# Patient Record
Sex: FEMALE | Race: WHITE | NOT HISPANIC OR LATINO | Employment: PART TIME | URBAN - METROPOLITAN AREA
[De-identification: names, ages, dates, MRNs, and addresses within clinical notes are randomized per-mention and may not be internally consistent; named-entity substitution may affect disease eponyms.]

---

## 2018-06-21 ENCOUNTER — APPOINTMENT (OUTPATIENT)
Dept: RADIOLOGY | Facility: MEDICAL CENTER | Age: 68
End: 2018-06-21
Payer: MEDICARE

## 2018-06-21 ENCOUNTER — OFFICE VISIT (OUTPATIENT)
Dept: URGENT CARE | Facility: MEDICAL CENTER | Age: 68
End: 2018-06-21
Payer: MEDICARE

## 2018-06-21 VITALS
HEART RATE: 73 BPM | OXYGEN SATURATION: 98 % | WEIGHT: 198.6 LBS | BODY MASS INDEX: 36.55 KG/M2 | HEIGHT: 62 IN | TEMPERATURE: 97.4 F | RESPIRATION RATE: 18 BRPM | SYSTOLIC BLOOD PRESSURE: 179 MMHG | DIASTOLIC BLOOD PRESSURE: 81 MMHG

## 2018-06-21 DIAGNOSIS — M25.561 ACUTE PAIN OF RIGHT KNEE: ICD-10-CM

## 2018-06-21 DIAGNOSIS — R00.2 FLUTTERING SENSATION OF HEART: ICD-10-CM

## 2018-06-21 DIAGNOSIS — M25.561 ACUTE PAIN OF RIGHT KNEE: Primary | ICD-10-CM

## 2018-06-21 LAB
ATRIAL RATE: 68 BPM
P AXIS: 52 DEGREES
PR INTERVAL: 154 MS
QRS AXIS: 4 DEGREES
QRSD INTERVAL: 136 MS
QT INTERVAL: 458 MS
QTC INTERVAL: 487 MS
T WAVE AXIS: 9 DEGREES
VENTRICULAR RATE: 68 BPM

## 2018-06-21 PROCEDURE — 93010 ELECTROCARDIOGRAM REPORT: CPT | Performed by: INTERNAL MEDICINE

## 2018-06-21 PROCEDURE — 93005 ELECTROCARDIOGRAM TRACING: CPT | Performed by: NURSE PRACTITIONER

## 2018-06-21 PROCEDURE — G0463 HOSPITAL OUTPT CLINIC VISIT: HCPCS | Performed by: NURSE PRACTITIONER

## 2018-06-21 PROCEDURE — 99204 OFFICE O/P NEW MOD 45 MIN: CPT | Performed by: NURSE PRACTITIONER

## 2018-06-21 PROCEDURE — 73564 X-RAY EXAM KNEE 4 OR MORE: CPT

## 2018-06-21 RX ORDER — ASCORBIC ACID 500 MG
500 TABLET ORAL DAILY
COMMUNITY

## 2018-06-21 RX ORDER — CHLORAL HYDRATE 500 MG
1000 CAPSULE ORAL DAILY
COMMUNITY

## 2018-06-21 RX ORDER — ASPIRIN 81 MG/1
81 TABLET ORAL DAILY
COMMUNITY

## 2018-06-21 RX ORDER — KETOROLAC TROMETHAMINE 30 MG/ML
60 INJECTION, SOLUTION INTRAMUSCULAR; INTRAVENOUS ONCE
Status: COMPLETED | OUTPATIENT
Start: 2018-06-21 | End: 2018-06-21

## 2018-06-21 RX ORDER — MULTIVITAMIN
1 TABLET ORAL DAILY
COMMUNITY

## 2018-06-21 RX ORDER — MELATONIN
1000 DAILY
COMMUNITY

## 2018-06-21 RX ADMIN — KETOROLAC TROMETHAMINE 60 MG: 30 INJECTION, SOLUTION INTRAMUSCULAR; INTRAVENOUS at 09:26

## 2018-06-21 NOTE — PROGRESS NOTES
[facility]        NAME: Jennifer Andrade is a 79 y o  female  : 1950    MRN: [id number]  DATE: 2018  TIME: 9:51 AM    Assessment and Plan   Acute pain of right knee [M25 561]  1  Acute pain of right knee  ketorolac (TORADOL) injection 60 mg    XR knee 4+ vw right injury   2  Fluttering sensation of heart  POCT ECG         Patient Instructions   In office x ray of R knee interpreted as negative for acute fracture, suspect degenerative changes  In office EKG interpreted as NSR, R BBB  Gentle stretches, gentle massage  NSAIDs as needed  Apply ice locally  Elevate affected extremity when at rest    Advance activity as tolerated  Utilize ACE wrap and cane when awake  Wear supportive shoes  Establish care with PCP  Proceed to  ER if symptoms worsen  Chief Complaint     Chief Complaint   Patient presents with    Leg Pain     Patient relates started with right upper thigh pain since yesterday  Denies injury  States, yesterday she was short of breath and heart beating fast          History of Present Illness       Patient presents with R thigh pain radiating down behind R knee  Reports was like that yesterday but seems to have progressively getting worse  Reports has had to "pull R leg along"  Denies injury, trauma, fall  Recent increase in yard work  Intermittent numbness of R thigh  Applied ice and heat with no improvement  Took two aleve this am at around 0230 with no improvement  Stands on feet at work  Denies bleeding disorders, blood clots  Reports also having chest palpitations yesterday with SOB, relates it to time of increased pain  Reports continued through shift and then resolved at home after applying a pillow to chest  No otc meds taken  Denies chest pain  No family doctor  Review of Systems   Review of Systems   Constitutional: Negative for chills and fever  Respiratory: Negative for chest tightness, shortness of breath and wheezing      Cardiovascular: Positive for palpitations  Negative for chest pain  Musculoskeletal: Positive for arthralgias, gait problem, joint swelling and myalgias  Skin: Negative for rash  Current Medications       Current Outpatient Prescriptions:     ascorbic acid (VITAMIN C) 500 mg tablet, Take 500 mg by mouth daily, Disp: , Rfl:     aspirin (ECOTRIN LOW STRENGTH) 81 mg EC tablet, Take 81 mg by mouth daily, Disp: , Rfl:     Calcium 150 MG TABS, Take by mouth daily, Disp: , Rfl:     cholecalciferol (VITAMIN D3) 1,000 units tablet, Take 1,000 Units by mouth daily, Disp: , Rfl:     Cyanocobalamin (VITAMIN B 12 PO), Take 500 mg by mouth daily, Disp: , Rfl:     Multiple Vitamin (MULTIVITAMIN) tablet, Take 1 tablet by mouth daily, Disp: , Rfl:     Omega-3 Fatty Acids (FISH OIL) 1,000 mg, Take 1,000 mg by mouth daily, Disp: , Rfl:   No current facility-administered medications for this visit  Current Allergies     Allergies as of 06/21/2018    (No Known Allergies)            The following portions of the patient's history were reviewed and updated as appropriate: allergies, current medications, past family history, past medical history, past social history, past surgical history and problem list      Past Medical History:   Diagnosis Date    Osteopenia        No past surgical history on file  No family history on file  Medications have been verified  Objective   BP (!) 179/81   Pulse 73   Temp (!) 97 4 °F (36 3 °C) (Tympanic)   Resp 18   Ht 5' 2" (1 575 m)   Wt 90 1 kg (198 lb 9 6 oz)   SpO2 98%   BMI 36 32 kg/m²        Physical Exam     Physical Exam   Constitutional: She is oriented to person, place, and time  She appears well-developed and well-nourished  No distress  HENT:   Head: Normocephalic and atraumatic  Eyes: Pupils are equal, round, and reactive to light  Cardiovascular: Normal rate, regular rhythm, normal heart sounds and intact distal pulses      Pulmonary/Chest: Effort normal and breath sounds normal  No respiratory distress  She has no wheezes  Musculoskeletal:        Right knee: She exhibits decreased range of motion, swelling and bony tenderness  She exhibits no effusion, no ecchymosis, no laceration and no erythema  Tenderness found  Medial joint line and lateral joint line tenderness noted  Right ankle: Normal         Right upper leg: She exhibits tenderness  She exhibits no bony tenderness  Legs:  Neurological: She is alert and oriented to person, place, and time  Skin: Skin is warm and dry  No rash noted  She is not diaphoretic  Psychiatric: She has a normal mood and affect  Nursing note and vitals reviewed

## 2018-06-21 NOTE — LETTER
June 21, 2018     Patient: Carol Hamm   YOB: 1950   Date of Visit: 6/21/2018       To Whom it May Concern:    Carol Hamm was seen in my clinic on 6/21/2018  She may return to work on Friday, June 22, 2018  If you have any questions or concerns, please don't hesitate to call           Sincerely,          RENUKA Medina        CC: No Recipients

## 2018-06-21 NOTE — PATIENT INSTRUCTIONS
Gentle stretches, gentle massage  NSAIDs as needed  Apply ice locally  Elevate affected extremity when at rest    Advance activity as tolerated  Utilize ACE wrap when awake  Wear supportive shoes  Follow up with PCP as discussed or go to nearest ED if exacerbation of symptoms  Arthritis   AMBULATORY CARE:   Arthritis  is a disease that causes inflammation in one or more joints  There are many types of arthritis, such as osteoarthritis, rheumatoid arthritis, and septic arthritis  Some types cause inflammation in the joints  Other types wear away the cartilage between joints  This makes the bones of the joint rub together when you move the joint  Your symptoms may be constant, or symptoms may come and go  Arthritis often gets worse over time and can cause permanent joint damage  Common signs and symptoms of arthritis:   · Pain, swelling, or stiffness in the joint    · Limited range of motion in the joint    · Warmth or redness over the joint    · Tenderness when you touch the joint    · Stiff joints in the morning that loosen with movement    · A creaking or grinding sound when you move the joint    · Fever  Seek care immediately if:   · You have a fever and severe joint pain or swelling  · You cannot move the affected joint  · You have severe joint pain you cannot tolerate  Contact your healthcare provider if:   · Your pain or swelling does not get better with treatment  · You have questions or concerns about your condition or care  Treatment  will depend on the type of arthritis you have and if it is severe  You may need any of the following:  · Acetaminophen  decreases pain and fever  It is available without a doctor's order  Ask how much to take and how often to take it  Follow directions  Acetaminophen can cause liver damage if not taken correctly  · NSAIDs , such as ibuprofen, help decrease swelling, pain, and fever  This medicine is available with or without a doctor's order  NSAIDs can cause stomach bleeding or kidney problems in certain people  If you take blood thinner medicine, always ask your healthcare provider if NSAIDs are safe for you  Always read the medicine label and follow directions  · Steroid medicine  helps reduce swelling and pain  · Surgery  may be needed to repair or replace a damaged joint  Manage arthritis:   · Rest your painful joint so it can heal   Your healthcare provider may recommend crutches or a walker if the affected joint is in a leg  · Apply ice or heat to the joint  Both can help decrease swelling and pain  Ice may also help prevent tissue damage  Use an ice pack, or put crushed ice in a plastic bag  Cover it with a towel and place it on your joint for 15 to 20 minutes every hour or as directed  You can apply heat for 20 minutes every 2 hours  Heat treatment includes hot packs or heat lamps  · Elevate your joint  Elevation helps reduce swelling and pain  Raise your joint above the level of your heart as often as you can  Prop your painful joint on pillows to keep it above your heart comfortably  · Go to physical or occupational therapy as directed  A physical therapist can teach you exercises to improve flexibility and range of motion  You may also be shown non-weight-bearing exercises that are safe for your joints, such as swimming  Exercise can help keep your joints flexible and reduce pain  An occupational therapist can help you learn to do your daily activities when your joints are stiff or sore  · Maintain a healthy weight  Extra weight puts increased pressure on your joints  Ask your healthcare provider what you should weigh  If you need to lose weight, he can help you create a weight loss program  Weight loss can help reduce pain and increase your ability to do your activities  The amount of exercise you do may vary each day, depending on your symptoms  · Wear flat or low-heeled shoes    This will help decrease pain and reduce pressure on your ankle, knee, and hip joints  · Use support devices as directed  You may be given splints to wear on your hands to help your joints rest and to decrease inflammation  While you sleep, use a pillow that is firm enough to support your neck and head  Other equipment  that may help you move and prevent falls:  · Orthotic shoes or insoles  help support your feet when you walk  · Crutches, a cane, or a walker  may help decrease your risk for falling  They also decrease stress on affected joints  · Devices to prevent falls  include raised toilet seats and bathtub bars to help you get up from sitting  Handrails can be placed in areas where you need balance and support  Follow up with your healthcare provider or rheumatologist as directed:  Write down your questions so you remember to ask them during your visits  © 2017 [employer] Information is for End User's use only and may not be sold, redistributed or otherwise used for commercial purposes  All illustrations and images included in CareNotes® are the copyrighted property of [facility] , [employer]  or [employer]oe  The above information is an  only  It is not intended as medical advice for individual conditions or treatments  Talk to your doctor, nurse or pharmacist before following any medical regimen to see if it is safe and effective for you

## 2018-08-14 ENCOUNTER — EVALUATION (OUTPATIENT)
Dept: PHYSICAL THERAPY | Facility: MEDICAL CENTER | Age: 68
End: 2018-08-14
Payer: MEDICARE

## 2018-08-14 DIAGNOSIS — M00.9 PYOGENIC ARTHRITIS OF RIGHT KNEE JOINT, DUE TO UNSPECIFIED ORGANISM (HCC): Primary | ICD-10-CM

## 2018-08-14 DIAGNOSIS — M25.561 RIGHT KNEE PAIN, UNSPECIFIED CHRONICITY: ICD-10-CM

## 2018-08-14 PROCEDURE — 97535 SELF CARE MNGMENT TRAINING: CPT | Performed by: PHYSICAL THERAPIST

## 2018-08-14 PROCEDURE — 97161 PT EVAL LOW COMPLEX 20 MIN: CPT | Performed by: PHYSICAL THERAPIST

## 2018-08-14 PROCEDURE — G8979 MOBILITY GOAL STATUS: HCPCS | Performed by: PHYSICAL THERAPIST

## 2018-08-14 PROCEDURE — G8978 MOBILITY CURRENT STATUS: HCPCS | Performed by: PHYSICAL THERAPIST

## 2018-08-14 NOTE — PROGRESS NOTES
PT Evaluation     Today's date: 2018  Patient name: Sheryl Leal  : 1950  MRN: [id number]  Referring provider: Akira Calero PA-C  Dx:   Encounter Diagnosis     ICD-10-CM    1  Pyogenic arthritis of right knee joint, due to unspecified organism ([facility] [address] ) M00 9    2  Right knee pain, unspecified chronicity M25 561                   Assessment  Impairments: abnormal gait, abnormal or restricted ROM, activity intolerance, impaired balance, impaired physical strength and lacks appropriate home exercise program    Assessment details: Ms Louise Pham is a very pleasant 79 y o  Female who presents today s/p sept arthritis of the right knee  No further referral appears necessary at this time based upon exam findings  No signs/symptoms of infection or DVT noted  Reviewed signs/symptoms of infection and DVT with patient in which she verbalizes understanding  Reviewed and discussed with patient keeping wounds dry and clean  Patient presents with decreased ROM and strength of the right knee leading to abmulatory dysfucntion limiting her ability to walk independently, return to work, and return to driving  Patient instructed in initial HEP  Impairment list:  1) right LE swelling- will address with muscle pump exercises and modalities  2) decreased right knee ROM- will address with ROM and mobility exercises  3) poor quadriceps neuromuscular control- will address with NMRE  4) decreased right LE strength- will address with progressive strengthening  5) ambulatory dysfunction- will address with gait training  Understanding of Dx/Px/POC: good   Prognosis: good  Prognosis details: Prognosis is good given compliance HEP and PT 2x/week for 10 weeks  Positive prognostic factors: motivated to improve, motivated to return to work, caregiver support  Negative prognostic factors: BMI > 30, osteoporosis, length of hospital stay    Goals  1  Patient will be independent in individualized HEP    2  Patient will ambulate without an AD   3  Patient will be able to negotiate stairs with step over pattern  4  Patient will be able to return to work without limitation due to WB intolerance  5  Patient will return to all activities at [facility]  Plan  Patient would benefit from: skilled physical therapy  Planned modality interventions: cryotherapy  Planned therapy interventions: manual therapy, patient education, balance, gait training, home exercise program, therapeutic activities, therapeutic exercise and functional ROM exercises  Frequency: 2x week  Duration in weeks: 10  Treatment plan discussed with: patient  Plan details: Please contact me with any questions  Thank you for the referral          Subjective Evaluation    History of Present Illness  Mechanism of injury: Ms Jadine Riedel is a 79 y o  Female who presents today with reports of right knee pain  Patient states in the beginning of June she was riding her bike and felt a pop in her knee  One week later she noted increased pain and significant swelling of the right knee and leg  She followed up with urgent care where she was provided with pain medication  She states the swelling and pain continued to increase and she had difficulty walking  She went to a different urgent care where she states she had her knee drained  She was sent to the hospital at that time in which she was diagnosed with a step infection, per patient report  She states she spent 15 days in the hospital and received 3 surgeries to wash out the injection, most recent on 6/26  She followed up with one month in inpatient rehab  Patient is currently ambulating with a RW but does own a SPC  Patient was independent with all ADLs/IADLs prior to the injection and surgery  She is currently showering, dressing, and participating in hygiene independently  She is not currently working  She states her goals are to return to walking normal and to be able to return to work     FIollow up visit with MD 8/23    Occupation: Manager at [employer]  Pain  Current pain ratin  At best pain ratin  At worst pain ratin  Location: anterior knee  Quality: throbbing  Exacerbated by: increased activity, bending the knee  Social Support  Steps to enter house: yes (bilateral handrails)  4  Stairs in house: no   Lives in: [place]  Lives with: significant other    Employment status: not working  Treatments  Previous treatment: physical therapy and occupational therapy (inpatient rehab)  Patient Goals  Patient goals for therapy: return to work and independence with ADLs/IADLs  Patient's goals regarding treatment: walk without an AD  Objective     Observations     Right Knee   Positive for edema and incision  Negative for drainage  Right Ankle/Foot   Positive for edema       Additional Observation Details  Incision site on anterior aspect of the right knee healing well  Incision site on medial proximal calf healing well, steri strips intact  Right lower leg pitting edema, hemosiderin staining observed at anterior calf and Dorsalis pedis pulse 1+ bilaterally  Feet/toes warm to touch    Tenderness     Additional Tenderness Details  (-) TTP    Active Range of Motion   Left Knee   Flexion: 122 degrees   Extension: 0 degrees     Right Knee   Flexion: 82 degrees   Extension: 20 degrees     Passive Range of Motion     Right Knee   Flexion: 85 degrees     Strength/Myotome Testing     Left Knee   Flexion: 5  Extension: 5  Quadriceps contraction: fair    Right Knee   Right knee flexion strength: NT   Right knee extension strength: NT   Quadriceps contraction: poor    Ambulation     Ambulation: Level Surfaces     Additional Level Surfaces Ambulation Details  Ambulates with RW walker; alternates between step-to and step-through pattern with decreased left step length and decreased right stance time; decreased heel strike on right; decreased knee flexion on right; overall decreased gait speed          Precautions: Osteoporosis, s/p septic arthritis    Daily Treatment Diary     Manual  8/14            PROM of right knee to tolerance                                                                     Exercise Diary              Heel slides 10"x15            Quad sets 5"x10            Ankle pumps elevated x10            TB ankle DF/PF nv            SAQ nv            LAQ nv            bike nv            Gait training             Weight shifting/ pre-gait training nv            HR/TR                                                                                                                                                   Modalities              CP prn                                           Impairment list:  1) right LE swelling- will address with muscle pump exercises and modalities  2) decreased right knee ROM- will address with ROM and mobility exercises  3) poor quadriceps neuromuscular control- will address with NMRE  4) decreased right LE strength- will address with progressive strengthening  5) ambulatory dysfunction- will address with gait training

## 2018-08-14 NOTE — LETTER
August 15, 2018    Dinorah Mcclain PA-C  [address]  [facility] [address]    Patient: Vanna Alonzo   YOB: 1950   Date of Visit: 2018     Encounter Diagnosis     ICD-10-CM    1  Pyogenic arthritis of right knee joint, due to unspecified organism ([facility] [address] ) M00 9    2  Right knee pain, unspecified chronicity M25 561        Dear Dr Fatoumata Stewart:    Please review the attached Plan of Care from [facility] recent visit  Please verify that you agree therapy should continue by signing the attached document and sending it back to our office  If you have any questions or concerns, please don't hesitate to call  Sincerely,    Toshia Henson PT      Referring Provider:      I certify that I have read the below Plan of Care and certify the need for these services furnished under this plan of treatment while under my care  Dinorah Mcclain PA-C  [address] [address]  VIA Facsimile: [phone]          PT Evaluation     Today's date: 2018  Patient name: Vanna Alonzo  : 1950  MRN: [id number]  Referring provider: Myra Stallings PA-C  Dx:   Encounter Diagnosis     ICD-10-CM    1  Pyogenic arthritis of right knee joint, due to unspecified organism ([facility] ) M00 9    2  Right knee pain, unspecified chronicity M25 561                   Assessment  Impairments: abnormal gait, abnormal or restricted ROM, activity intolerance, impaired balance, impaired physical strength and lacks appropriate home exercise program    Assessment details: Ms Fatoumata Stewart is a very pleasant 79 y o  Female who presents today s/p sept arthritis of the right knee  No further referral appears necessary at this time based upon exam findings  No signs/symptoms of infection or DVT noted  Reviewed signs/symptoms of infection and DVT with patient in which she verbalizes understanding  Reviewed and discussed with patient keeping wounds dry and clean   Patient presents with decreased ROM and strength of the right knee leading to abmulatory dysfucntion limiting her ability to walk independently, return to work, and return to driving  Patient instructed in initial HEP  Impairment list:  1) right LE swelling- will address with muscle pump exercises and modalities  2) decreased right knee ROM- will address with ROM and mobility exercises  3) poor quadriceps neuromuscular control- will address with NMRE  4) decreased right LE strength- will address with progressive strengthening  5) ambulatory dysfunction- will address with gait training  Understanding of Dx/Px/POC: good   Prognosis: good  Prognosis details: Prognosis is good given compliance HEP and PT 2x/week for 10 weeks  Positive prognostic factors: motivated to improve, motivated to return to work, caregiver support  Negative prognostic factors: BMI > 30, osteoporosis, length of hospital stay    Goals  1  Patient will be independent in individualized HEP  2  Patient will ambulate without an AD  3  Patient will be able to negotiate stairs with step over pattern  4  Patient will be able to return to work without limitation due to WB intolerance  5  Patient will return to all activities at [facility]  Plan  Patient would benefit from: skilled physical therapy  Planned modality interventions: cryotherapy  Planned therapy interventions: manual therapy, patient education, balance, gait training, home exercise program, therapeutic activities, therapeutic exercise and functional ROM exercises  Frequency: 2x week  Duration in weeks: 10  Treatment plan discussed with: patient  Plan details: Please contact me with any questions  Thank you for the referral          Subjective Evaluation    History of Present Illness  Mechanism of injury: Ms Gonzalo Wilkinson is a 79 y o  Female who presents today with reports of right knee pain  Patient states in the beginning of June she was riding her bike and felt a pop in her knee   One week later she noted increased pain and significant swelling of the right knee and leg  She followed up with urgent care where she was provided with pain medication  She states the swelling and pain continued to increase and she had difficulty walking  She went to a different urgent care where she states she had her knee drained  She was sent to the hospital at that time in which she was diagnosed with a step infection, per patient report  She states she spent 15 days in the hospital and received 3 surgeries to wash out the injection, most recent on   She followed up with one month in inpatient rehab  Patient is currently ambulating with a RW but does own a SPC  Patient was independent with all ADLs/IADLs prior to the injection and surgery  She is currently showering, dressing, and participating in hygiene independently  She is not currently working  She states her goals are to return to walking normal and to be able to return to work  FIollow up visit with MD     Occupation: Manager at [employer]  Pain  Current pain ratin  At best pain ratin  At worst pain ratin  Location: anterior knee  Quality: throbbing  Exacerbated by: increased activity, bending the knee  Social Support  Steps to enter house: yes (bilateral handrails)  4  Stairs in house: no   L[facility] in: [facility]  Lives with: significant other    Employment status: not working  Treatments  Previous treatment: physical therapy and occupational therapy (inpatient rehab)  Patient Goals  Patient goals for therapy: return to work and independence with ADLs/IADLs  Patient's goals regarding treatment: walk without an AD  Objective     Observations     Right Knee   Positive for edema and incision  Negative for drainage  Right Ankle/Foot   Positive for edema       Additional Observation Details  Incision site on anterior aspect of the right knee healing well  Incision site on medial proximal calf healing well, steri strips intact  Right lower leg pitting edema, hemosiderin staining observed at anterior calf and Dorsalis pedis pulse 1+ bilaterally  Feet/toes warm to touch    Tenderness     Additional Tenderness Details  (-) TTP    Active Range of Motion   Left Knee   Flexion: 122 degrees   Extension: 0 degrees     Right Knee   Flexion: 82 degrees   Extension: 20 degrees     Passive Range of Motion     Right Knee   Flexion: 85 degrees     Strength/Myotome Testing     Left Knee   Flexion: 5  Extension: 5  Quadriceps contraction: fair    Right Knee   Right knee flexion strength: NT   Right knee extension strength: NT   Quadriceps contraction: poor    Ambulation     Ambulation: Level Surfaces     Additional Level Surfaces Ambulation Details  Ambulates with RW walker; alternates between step-to and step-through pattern with decreased left step length and decreased right stance time; decreased heel strike on right; decreased knee flexion on right; overall decreased gait speed          Precautions: Osteoporosis, s/p septic arthritis    Daily Treatment Diary     Manual  8/14            PROM of right knee to tolerance                                                                     Exercise Diary              Heel slides 10"x15            Quad sets 5"x10            Ankle pumps elevated x10            TB ankle DF/PF nv            SAQ nv            LAQ nv            bike nv            Gait training             Weight shifting/ pre-gait training nv            HR/TR                                                                                                                                                   Modalities              CP prn                                           Impairment list:  1) right LE swelling- will address with muscle pump exercises and modalities  2) decreased right knee ROM- will address with ROM and mobility exercises  3) poor quadriceps neuromuscular control- will address with NMRE  4) decreased right LE strength- will address with progressive strengthening  5) ambulatory dysfunction- will address with gait training

## 2018-08-15 ENCOUNTER — TRANSCRIBE ORDERS (OUTPATIENT)
Dept: PHYSICAL THERAPY | Facility: MEDICAL CENTER | Age: 68
End: 2018-08-15

## 2018-08-15 DIAGNOSIS — M00.9 PYOGENIC ARTHRITIS OF RIGHT KNEE JOINT, DUE TO UNSPECIFIED ORGANISM (HCC): Primary | ICD-10-CM

## 2018-08-17 ENCOUNTER — OFFICE VISIT (OUTPATIENT)
Dept: PHYSICAL THERAPY | Facility: MEDICAL CENTER | Age: 68
End: 2018-08-17
Payer: MEDICARE

## 2018-08-17 DIAGNOSIS — M25.561 RIGHT KNEE PAIN, UNSPECIFIED CHRONICITY: ICD-10-CM

## 2018-08-17 DIAGNOSIS — M00.9 PYOGENIC ARTHRITIS OF RIGHT KNEE JOINT, DUE TO UNSPECIFIED ORGANISM (HCC): Primary | ICD-10-CM

## 2018-08-17 PROCEDURE — 97112 NEUROMUSCULAR REEDUCATION: CPT | Performed by: PHYSICAL THERAPIST

## 2018-08-17 PROCEDURE — 97140 MANUAL THERAPY 1/> REGIONS: CPT | Performed by: PHYSICAL THERAPIST

## 2018-08-17 NOTE — PROGRESS NOTES
Daily Note     Today's date: 2018  Patient name: Shadi Moss  : 1950  MRN: [id number]  Referring provider: Jaydon Vogt PA-C  Dx:   Encounter Diagnosis     ICD-10-CM    1  Pyogenic arthritis of right knee joint, due to unspecified organism ([facility] [address] ) M00 9    2  Right knee pain, unspecified chronicity M25 561                   Subjective: patient states she has been compliant with her home exercises and feels her knee is moving better  Objective: See treatment diary below      Precautions: Osteoporosis, s/p septic arthritis    Daily Treatment Diary     Manual             PROM of right knee to tolerance  supine and prone CK                                                                   Exercise Diary              Heel slides 10"x15 10"x15           Quad sets 5"x10 5", 3x10           Ankle pumps elevated x10 x30           TB ankle DF/PF nv orange x20 ea           SAQ nv 2x10           LAQ nv nv           bike nv AAROM x5'           Gait training  x5'           Weight shifting/ pre-gait training nv nv           HR/TR  nv                                                                                                                                                 Modalities              CP prn  Ice at home                                             Assessment: Tolerated treatment well  Patient demonstrated fatigue post treatment     Impairment list:  1) right LE swelling- addressing with muscle pump exercises and modalities  2) decreased right knee ROM- addressing with ROM and mobility exercises- tolerated PROM well, continues to have limited knee ROM  3) poor quadriceps neuromuscular control- addressing with NMRE- poor  4) decreased right LE strength- adressing with progressive strengthening  5) ambulatory dysfunction- addressing with gait training        Plan: Continue per plan of care

## 2018-08-22 ENCOUNTER — OFFICE VISIT (OUTPATIENT)
Dept: PHYSICAL THERAPY | Facility: MEDICAL CENTER | Age: 68
End: 2018-08-22
Payer: MEDICARE

## 2018-08-22 DIAGNOSIS — M00.9 PYOGENIC ARTHRITIS OF RIGHT KNEE JOINT, DUE TO UNSPECIFIED ORGANISM (HCC): Primary | ICD-10-CM

## 2018-08-22 DIAGNOSIS — M25.561 RIGHT KNEE PAIN, UNSPECIFIED CHRONICITY: ICD-10-CM

## 2018-08-22 PROCEDURE — 97110 THERAPEUTIC EXERCISES: CPT | Performed by: PHYSICAL THERAPIST

## 2018-08-22 PROCEDURE — 97112 NEUROMUSCULAR REEDUCATION: CPT | Performed by: PHYSICAL THERAPIST

## 2018-08-22 NOTE — PROGRESS NOTES
Daily Note     Today's date: 2018  Patient name: Norman Lopez  : 1950  MRN: [id number]  Referring provider: Kalli Metcalf PA-C  Dx:   Encounter Diagnosis     ICD-10-CM    1  Pyogenic arthritis of right knee joint, due to unspecified organism ([facility] [address] ) M00 9    2  Right knee pain, unspecified chronicity M25 561                   Subjective: Patient states her pain iIn her right knee has been improving  Objective: See treatment diary below      Precautions: Osteoporosis, s/p septic arthritis    Daily Treatment Diary     Manual            PROM of right knee to tolerance  supine and prone CK                                                                   Exercise Diary              Heel slides 10"x15 10"x15           Quad sets 5"x10 5", 3x10 5", 3x10          Ankle pumps elevated x10 x30 HEP          TB ankle DF/PF nv orange x20 ea orange x 30 ea          SAQ nv 2x10 2x10          LAQ nv nv           bike nv AAROM x5' AAROM x10'          Gait training  x5' x5'          Weight shifting/ pre-gait training nv nv           HR/TR  nv           Prone hang   2 x 2'          Calf stretch with strap   20"x 4          Seated hamstring stretch (long sit)   20"x4          Prone knee flexion   2x10                                                                                            Modalities              CP   Ice at home 10' with heel prop for ext                                            Assessment: Patient ambulating with increased right knee flexion during stance with poor heel strike noted  Increased knee flexion noted in supine and long sit with poor tolerance to full extension  Treatment session focused on knee extension to prevent patient from developing a flexion contracture  Patient with fair tolerance to extension exercises however knee extension ROM did improve post treatment session   Extensively discussed HEP regarding knee extension and preventing sitting with pillows under the knee      Wound on right anterior knee observed today with white inner color, no drainage observed  Knee temperature only slightly warmer to touch compared to contralateral side  Patient instructed to continue to monitor for drainage, pus, increased knee temperature, fever, chills  Patient able to achieve full revolution on recumbent bike this session  Impairment list:  1) right LE swelling- addressing with muscle pump exercises and modalities  2) decreased right knee ROM (extension > flexion)- addressing with ROM and mobility exercises- tolerated PROM well, continues to have limited knee ROM  3) poor quadriceps neuromuscular control- addressing with NMRE- poor  4) decreased right LE strength- adressing with progressive strengthening  5) ambulatory dysfunction- addressing with gait training        Plan: Continue per plan of care

## 2018-08-24 ENCOUNTER — OFFICE VISIT (OUTPATIENT)
Dept: PHYSICAL THERAPY | Facility: MEDICAL CENTER | Age: 68
End: 2018-08-24
Payer: MEDICARE

## 2018-08-24 DIAGNOSIS — M00.9 PYOGENIC ARTHRITIS OF RIGHT KNEE JOINT, DUE TO UNSPECIFIED ORGANISM (HCC): Primary | ICD-10-CM

## 2018-08-24 DIAGNOSIS — M25.561 RIGHT KNEE PAIN, UNSPECIFIED CHRONICITY: ICD-10-CM

## 2018-08-24 PROCEDURE — 97116 GAIT TRAINING THERAPY: CPT | Performed by: PHYSICAL THERAPIST

## 2018-08-24 PROCEDURE — 97110 THERAPEUTIC EXERCISES: CPT | Performed by: PHYSICAL THERAPIST

## 2018-08-24 NOTE — PROGRESS NOTES
Daily Note     Today's date: 2018  Patient name: Cecilia Shankar  : 1950  MRN: [id number]  Referring provider: Alicia Parish PA-C  Dx:   Encounter Diagnosis     ICD-10-CM    1  Pyogenic arthritis of right knee joint, due to unspecified organism ([facility] [address] ) M00 9    2  Right knee pain, unspecified chronicity M25 561                   Subjective: Patient states she followed up with her MD yesterday and was told everything was healing well  She was cleared to return to work for 9/15 however expresses apprehension about being able to return with the cane  She states her right knee is getting straighter following her stretches and can get the back of her knee resting on the support surface  Objective: See treatment diary below      Precautions: Osteoporosis, s/p septic arthritis    Daily Treatment Diary     Manual           PROM of right knee to tolerance  supine and prone CK                                                                   Exercise Diary              Heel slides 10"x15 10"x15  10"x15 with therapist OP         Quad sets 5"x10 5", 3x10 5", 3x10          Ankle pumps elevated x10 x30 HEP HEP         TB ankle DF/PF nv orange x20 ea orange x 30 ea HEP         SAQ nv 2x10 2x10                       bike nv AAROM x5' AAROM x10' x10'         Gait training  x5' x5' x15' w/ [facility]         Weight shifting/ pre-gait training nv nv  x5'         HR/TR  nv  nv         Prone hang   2 x 2' 2 x2'         Calf stretch with strap   20"x 4 20"x4         Seated hamstring stretch (long sit)   20"x4 20"x4         Prone knee flexion   2x10 2x10 standing         stairs                                                                                  Modalities              CP   Ice at home 10' with heel prop for ext Will perform at home                                           Assessment: Gait training initiated with [facility] this session  Patient very apprehensive to start  Gait belt donned for safety  Patient instructed in appropriate use with SPC on left side to counter the right knee  Performed pre-gait training with weight shifting progressing to weight shift with step through progressing to ambulation for 100 ft with SPC and CS  Emphasis on step through pattern and heel strike to facilitate equal weightbearing and fluid gait  Patient significantly fatigued post ambulation therefore held on remaining TE  Patient will ice with heel prop at home and continue with previously established HEP for right ROM and quad strength  Impairment list:  1) right LE swelling- addressing with muscle pump exercises and modalities  2) decreased right knee ROM (extension > limitation compared to flexion)- addressing with ROM and mobility exercises- tolerated PROM well, continues to have limited knee ROM  3) poor quadriceps neuromuscular control- addressing with NMRE- poor  4) decreased right LE strength- adressing with progressive strengthening  5) ambulatory dysfunction- addressing with gait training with SPC      Goals  1  Patient will be independent in individualized HEP  2  Patient will ambulate without an AD - ambulating with SPC with CS  3  Patient will be able to negotiate stairs with step over pattern  4  Patient will be able to return to work without limitation due to WB intolerance  5  Patient will return to all activities at [facility]  Plan: Continue per plan of care

## 2018-08-28 ENCOUNTER — OFFICE VISIT (OUTPATIENT)
Dept: PHYSICAL THERAPY | Facility: MEDICAL CENTER | Age: 68
End: 2018-08-28
Payer: MEDICARE

## 2018-08-28 DIAGNOSIS — M25.561 RIGHT KNEE PAIN, UNSPECIFIED CHRONICITY: ICD-10-CM

## 2018-08-28 DIAGNOSIS — M00.9 PYOGENIC ARTHRITIS OF RIGHT KNEE JOINT, DUE TO UNSPECIFIED ORGANISM (HCC): Primary | ICD-10-CM

## 2018-08-28 PROCEDURE — 97116 GAIT TRAINING THERAPY: CPT | Performed by: PHYSICAL THERAPIST

## 2018-08-28 PROCEDURE — 97110 THERAPEUTIC EXERCISES: CPT | Performed by: PHYSICAL THERAPIST

## 2018-08-28 NOTE — PROGRESS NOTES
Daily Note     Today's date: 2018  Patient name: Derrick Grayson  : 1950  MRN: [id number]  Referring provider: Jarod Buenrostro PA-C  Dx:   Encounter Diagnosis     ICD-10-CM    1  Pyogenic arthritis of right knee joint, due to unspecified organism ([facility] [address] ) M00 9    2  Right knee pain, unspecified chronicity M25 561                   Subjective: Patient states she was fatigued following previous treatment session  She did try to use her cane at home over the weekend with her  but states she was scared and nervous of falling  Objective: See treatment diary below      Precautions: Osteoporosis, s/p septic arthritis    Daily Treatment Diary     Manual          PROM of right knee to tolerance  supine and prone CK                                                                   Exercise Diary              Heel slides 10"x15 10"x15  10"x15 with therapist OP 10"x 15 w/ therapist OP        Quad sets 5"x10 5", 3x10 5", 3x10  5" 3x10        Ankle pumps elevated x10 x30 HEP HEP         TB ankle DF/PF nv orange x20 ea orange x 30 ea HEP         SAQ nv 2x10 2x10  5" 2x10                     bike nv AAROM x5' AAROM x10' x10' x10'        Gait training  x5' x5' x15' w/ SPC x10' w/SPC        Weight shifting/ pre-gait training nv nv  x5' x5'        HR/TR  nv  nv 3x10        Prone hang   2 x 2' 2 x2' np        Calf stretch with strap   20"x 4 20"x4 20"x4        Seated hamstring stretch (long sit)   20"x4 20"x4 20"x4        Prone knee flexion   2x10 2x10 standing 3x10 standing        stairs     nv                                                                             Modalities              CP   Ice at home 10' with heel prop for ext Will perform at home Will perform at home                                          Assessment: Continued to focus on gait training with [facility] this session with pre gait training activities progressing to ambulation in in hallway   Gait belt donned for safety; patient ambulated 350 ft with SPC and CS  Emphasis on heel strike and TKE during stance and knee flexion during swing, also emphasized step through pattern to improve fluidity of gait  Patient apprehensive when approaching corners with decreased speed and transition to step-to pattern  Patient extensively educated on importance of continuing HEP with focus on achieving knee extension including quad sets, hamstring/gastroc stretches, SAQ, prone hangs, and icing with heel prop  Impairment list:  1) right LE swelling- addressing with muscle pump exercises and modalities  2) decreased right knee ROM (extension > limitation compared to flexion)- addressing with ROM and mobility exercises- tolerated PROM well, continues to have limited knee ROM  3) poor quadriceps neuromuscular control- addressing with NMRE- poor  4) decreased right LE strength- adressing with progressive strengthening  5) ambulatory dysfunction- addressing with gait training with SPC      Goals  1  Patient will be independent in individualized HEP  - ongoing  2  Patient will ambulate without an AD - ambulating with SPC with CS, improing  3  Patient will be able to negotiate stairs with step over pattern  4  Patient will be able to return to work without limitation due to WB intolerance  5  Patient will return to all activities at [facility]  Plan: Continue per plan of care

## 2018-08-30 ENCOUNTER — OFFICE VISIT (OUTPATIENT)
Dept: PHYSICAL THERAPY | Facility: MEDICAL CENTER | Age: 68
End: 2018-08-30
Payer: MEDICARE

## 2018-08-30 DIAGNOSIS — M25.561 RIGHT KNEE PAIN, UNSPECIFIED CHRONICITY: ICD-10-CM

## 2018-08-30 DIAGNOSIS — M00.9 PYOGENIC ARTHRITIS OF RIGHT KNEE JOINT, DUE TO UNSPECIFIED ORGANISM (HCC): Primary | ICD-10-CM

## 2018-08-30 PROCEDURE — 97116 GAIT TRAINING THERAPY: CPT | Performed by: PHYSICAL THERAPIST

## 2018-08-30 PROCEDURE — G8979 MOBILITY GOAL STATUS: HCPCS | Performed by: PHYSICAL THERAPIST

## 2018-08-30 PROCEDURE — G8978 MOBILITY CURRENT STATUS: HCPCS | Performed by: PHYSICAL THERAPIST

## 2018-08-30 PROCEDURE — 97110 THERAPEUTIC EXERCISES: CPT | Performed by: PHYSICAL THERAPIST

## 2018-08-30 PROCEDURE — 97530 THERAPEUTIC ACTIVITIES: CPT | Performed by: PHYSICAL THERAPIST

## 2018-08-30 NOTE — PROGRESS NOTES
Daily Note     Today's date: 2018  Patient name: Yung Saunders  : 1950  MRN: [id number]  Referring provider: Coretta Cooley PA-C  Dx:   Encounter Diagnosis     ICD-10-CM    1  Pyogenic arthritis of right knee joint, due to unspecified organism ([facility] [address] ) M00 9    2  Right knee pain, unspecified chronicity M25 561                   Subjective: Patient states she went to breakfast the other day and used her cane with her significant other close by  She states she has been using the cane nearly 100% in her house and is becoming more confident with ambulation         Objective: See treatment diary below      Precautions: Osteoporosis, s/p septic arthritis    Daily Treatment Diary     Manual          PROM of right knee to tolerance  supine and prone CK                                                                   Exercise Diary              Heel slides 10"x15 10"x15  10"x15 with therapist OP 10"x 15 w/ therapist OP 10"x15 w/ therapist OP       Quad sets 5"x10 5", 3x10 5", 3x10  5" 3x10 5" 3x10       Ankle pumps elevated x10 x30 HEP HEP         TB ankle DF/PF nv orange x20 ea orange x 30 ea HEP         SAQ nv 2x10 2x10  5" 2x10 5" 2x10                    bike nv AAROM x5' AAROM x10' x10' x10' x10'       Gait training  x5' x5' x15' w/ SPC x10' w/SPC x10' w/ [facility]       Weight shifting/ pre-gait training nv nv  x5' x5'        HR/TR  nv  nv 3x10 3x10 ea       Prone hang   2 x 2' 2 x2' np        Calf stretch with strap   20"x 4 20"x4 20"x4 20"x4       Seated hamstring stretch (long sit)   20"x4 20"x4 20"x4 20"x4       Prone knee flexion   2x10 2x10 standing 3x10 standing 3x10 standing       stairs     nv L3 x10         Standing TKE      5"x20       Forward lunging on step      L3 x15       SLR: flexion      x10 AAROM                                     Modalities              CP   Ice at home 10' with heel prop for ext Will perform at home Will perform at home AROM Right Knee   Flexion: 95 degrees   Extension: 10 degrees       Assessment: Patient ambulated 350ft with SPC and supervision  Patient demonstrated improved step through pattern, heel strike at initial contact, and improved swing phase; good safety  Patient continues to be apprehensive as she approaches other individuals within the building or when she approaches corners to turn slowing her overall gait speed and transitioning to a step to pattern  Patient is demonstrating improved quadriceps contraction, however this is slow to improve  Quad  Patient has demonstrated improved right knee ROM as noted above  Reviewed home exercises with patient's significant other to assist her at home in which he and the patient verbalize and demonstrate understanding  Patient instructed to transition fully to cane over the weekend  Impairment list:  1) right LE swelling- addressing with muscle pump exercises and modalities  2) decreased right knee ROM (extension > limitation compared to flexion)- addressing with ROM and mobility exercises- tolerated PROM well, continues to have limited knee ROM  3) poor quadriceps neuromuscular control- addressing with NMRE- poor  4) decreased right LE strength- adressing with progressive strengthening  5) ambulatory dysfunction- addressing with gait training with SPC      Goals  1  Patient will be independent in individualized HEP  - ongoing  2  Patient will ambulate without an AD - ambulating with SPC with supervision, improving  3  Patient will be able to negotiate stairs with step over pattern- able to negotiate with step to pattern and supervsion  4  Patient will be able to return to work without limitation due to WB intolerance  5  Patient will return to all activities at [facility]  Plan: Continue per plan of care

## 2018-09-05 ENCOUNTER — OFFICE VISIT (OUTPATIENT)
Dept: PHYSICAL THERAPY | Facility: MEDICAL CENTER | Age: 68
End: 2018-09-05
Payer: MEDICARE

## 2018-09-05 DIAGNOSIS — M00.9 PYOGENIC ARTHRITIS OF RIGHT KNEE JOINT, DUE TO UNSPECIFIED ORGANISM (HCC): Primary | ICD-10-CM

## 2018-09-05 DIAGNOSIS — M25.561 RIGHT KNEE PAIN, UNSPECIFIED CHRONICITY: ICD-10-CM

## 2018-09-05 PROCEDURE — 97112 NEUROMUSCULAR REEDUCATION: CPT | Performed by: PHYSICAL THERAPIST

## 2018-09-05 PROCEDURE — 97116 GAIT TRAINING THERAPY: CPT | Performed by: PHYSICAL THERAPIST

## 2018-09-05 PROCEDURE — 97530 THERAPEUTIC ACTIVITIES: CPT | Performed by: PHYSICAL THERAPIST

## 2018-09-05 PROCEDURE — 97110 THERAPEUTIC EXERCISES: CPT | Performed by: PHYSICAL THERAPIST

## 2018-09-05 NOTE — PROGRESS NOTES
Daily Note     Today's date: 2018  Patient name: Cecilia Shankar  : 1950  MRN: [id number]  Referring provider: Alicia Parish PA-C  Dx:   Encounter Diagnosis     ICD-10-CM    1  Pyogenic arthritis of right knee joint, due to unspecified organism ([facility] [address] ) M00 9    2  Right knee pain, unspecified chronicity M25 561                   Subjective: Patient states she has been walking with the cane consistently and has been walking in her house without any cane  She reports frustration with her walking pattern       Objective: See treatment diary below      Precautions: Osteoporosis, s/p septic arthritis    Daily Treatment Diary     Manual        PROM of right knee to tolerance  supine and prone CK                                                                   Exercise Diary              Heel slides 10"x15 10"x15  10"x15 with therapist OP 10"x 15 w/ therapist OP 10"x15 w/ therapist OP np-nv      Quad sets 5"x10 5", 3x10 5", 3x10  5" 3x10 5" 3x10 5" 3x10      Ankle pumps elevated x10 x30 HEP HEP         TB ankle DF/PF nv orange x20 ea orange x 30 ea HEP         SAQ nv 2x10 2x10  5" 2x10 5" 2x10 5" 3x10                   bike nv AAROM x5' AAROM x10' x10' x10' x10' x10'      Gait training  x5' x5' x15' w/ SPC x10' w/SPC x10' w/ SPC x10 w/ SPC      Weight shifting/ pre-gait training nv nv  x5' x5'        HR/TR  nv  nv 3x10 3x10 ea 3x10 ea      Prone hang   2 x 2' 2 x2' np  1# x5'      Calf stretch with strap   20"x 4 20"x4 20"x4 20"x4 20"x4      Seated hamstring stretch (long sit)   20"x4 20"x4 20"x4 20"x4 20"x4      Prone knee flexion   2x10 2x10 standing 3x10 standing 3x10 standing 3x10 standing      stairs     nv L3 x10   L3 x15      Standing TKE      5"x20 5"x30      Forward lunging on step      L3 x15 L3 x15      SLR: flexion      x10 AAROM x5 AAROM x5 AROM      LAQ       5" 2x10      minisquats       nv          Modalities              CP   Ice at home 10' with heel prop for ext Will perform at home Will perform at home                                    AROM Right Knee   Flexion: 95 degrees   Extension: 10 degrees       Assessment: Patient ambulated into clinic today with SPC  Gait training focused on step through pattern and increased knee flexion during swing  Also focused on increased hip and knee flexion with step ups to prevent circumduction during ascending steps  Continued to focus on achieving maximal extension, however this is slow to progress due to patients quadriceps inhibition and flexion contracture  Patient able tolerate prone hangs but unable to tolerate prone knee flexion due to pain in which patient becomes emotional and begins to cry  Quadriceps strength is continuing to improve gradually as noted with SLR, SAQ, and LAQ  Impairment list:  1) right LE swelling- addressing with muscle pump exercises and modalities  2) decreased right knee ROM (extension > limitation compared to flexion)- addressing with ROM and mobility exercises- tolerated PROM well, continues to have limited knee ROM  3) poor quadriceps neuromuscular control- addressing with NMRE- poor  4) decreased right LE strength- adressing with progressive strengthening  5) ambulatory dysfunction- addressing with gait training with SPC      Goals  1  Patient will be independent in individualized HEP  - ongoing  2  Patient will ambulate without an AD - ambulating with SPC with supervision, improving  3  Patient will be able to negotiate stairs with step over pattern- able to negotiate with step to pattern and supervsion  4  Patient will be able to return to work without limitation due to WB intolerance  5  Patient will return to all activities at [facility]  Plan: Continue per plan of care  Progress as tolerated  Emphasize gait training and achieving maximal knee extension

## 2018-09-07 ENCOUNTER — OFFICE VISIT (OUTPATIENT)
Dept: PHYSICAL THERAPY | Facility: MEDICAL CENTER | Age: 68
End: 2018-09-07
Payer: MEDICARE

## 2018-09-07 DIAGNOSIS — M25.561 RIGHT KNEE PAIN, UNSPECIFIED CHRONICITY: ICD-10-CM

## 2018-09-07 DIAGNOSIS — M00.9 PYOGENIC ARTHRITIS OF RIGHT KNEE JOINT, DUE TO UNSPECIFIED ORGANISM (HCC): Primary | ICD-10-CM

## 2018-09-07 PROCEDURE — 97112 NEUROMUSCULAR REEDUCATION: CPT | Performed by: PHYSICAL THERAPIST

## 2018-09-07 PROCEDURE — 97530 THERAPEUTIC ACTIVITIES: CPT | Performed by: PHYSICAL THERAPIST

## 2018-09-07 PROCEDURE — 97116 GAIT TRAINING THERAPY: CPT | Performed by: PHYSICAL THERAPIST

## 2018-09-07 NOTE — PROGRESS NOTES
Daily Note     Today's date: 2018  Patient name: Talat Neil  : 1950  MRN: [id number]  Referring provider: Jasmina Sebastian PA-C  Dx:   Encounter Diagnosis     ICD-10-CM    1  Pyogenic arthritis of right knee joint, due to unspecified organism ([facility] [address] ) M00 9    2  Right knee pain, unspecified chronicity M25 561                   Subjective: Patient denies any pain at home  Continues to report compliance with HEP       Objective: See treatment diary below      Precautions: Osteoporosis, s/p septic arthritis    Daily Treatment Diary     Manual       PROM of right knee to tolerance  supine and prone CK                                                                   Exercise Diary              Heel slides 10"x15 10"x15  10"x15 with therapist OP 10"x 15 w/ therapist OP 10"x15 w/ therapist OP np-nv 10"x 15     Quad sets 5"x10 5", 3x10 5", 3x10  5" 3x10 5" 3x10 5" 3x10 5" 3x10     bridge        2x10     SAQ nv 2x10 2x10  5" 2x10 5" 2x10 5" 3x10 5" 3x10     bike nv AAROM x5' AAROM x10' x10' x10' x10' x10' x10'     Gait training  x5' x5' x15' w/ SPC x10' w/SPC x10' w/ SPC x10 w/ SPC x8' w/ SPC     HR/TR  nv  nv 3x10 3x10 ea 3x10 ea 3x10 ea     Prone hang   2 x 2' 2 x2' np  1# x5' 2# x5'     Calf stretch with strap   20"x 4 20"x4 20"x4 20"x4 20"x4 20"x4     Seated hamstring stretch (long sit)   20"x4 20"x4 20"x4 20"x4 20"x4 20"x4     Prone knee flexion   2x10 2x10 standing 3x10 standing 3x10 standing 3x10 standing 1# 3x10     stairs     nv L3 x10   L3 x15 L3 x15  FWD/LAT     Standing TKE      5"x20 5"x30 np     Forward lunging on step      L3 x15 L3 x15 L3 x15     SLR: flexion      x10 AAROM x5 AAROM x5 AROM 2x10     LAQ       5" 2x10 5" 3x10     Chair squats       nv x15     Standing hip abduction        x20 B         Modalities              CP   Ice at home 10' with heel prop for ext Will perform at home Will perform at home                                    AROM Right Knee Flexion: 95 degrees   Extension: 10 degrees       Assessment: Patient improving in fluidity of gait with good step through and gait speed; focused on increasing knee swing today  Patient becoming less hesitant as she ambulates in the [place] approaching corners and other people in the building  Quad and hip abductor strength continue to be her most limiting factors at this time  Impairment list:  1) right LE swelling- addressing with muscle pump exercises and modalities  2) decreased right knee ROM (extension > limitation compared to flexion)- addressing with ROM and mobility exercises- tolerated PROM well, continues to have limited knee ROM  3) poor quadriceps neuromuscular control- addressing with NMRE- poor  4) decreased right LE strength- adressing with progressive strengthening  5) ambulatory dysfunction- addressing with gait training with SPC, will transition to ambulation without AD as strength improves      Goals  1  Patient will be independent in individualized HEP  - ongoing  2  Patient will ambulate without an AD - ambulating with SPC with supervision, improving  3  Patient will be able to negotiate stairs with step over pattern- able to negotiate with step to pattern and supervsion  4  Patient will be able to return to work without limitation due to WB intolerance  5  Patient will return to all activities at [facility]  Plan: Continue per plan of care  Progress as tolerated  Emphasize gait training and achieving maximal knee extension and quad strength

## 2018-09-11 ENCOUNTER — OFFICE VISIT (OUTPATIENT)
Dept: PHYSICAL THERAPY | Facility: MEDICAL CENTER | Age: 68
End: 2018-09-11
Payer: MEDICARE

## 2018-09-11 DIAGNOSIS — M25.561 RIGHT KNEE PAIN, UNSPECIFIED CHRONICITY: ICD-10-CM

## 2018-09-11 DIAGNOSIS — M00.9 PYOGENIC ARTHRITIS OF RIGHT KNEE JOINT, DUE TO UNSPECIFIED ORGANISM (HCC): Primary | ICD-10-CM

## 2018-09-11 PROCEDURE — 97112 NEUROMUSCULAR REEDUCATION: CPT | Performed by: PHYSICAL THERAPIST

## 2018-09-11 PROCEDURE — 97110 THERAPEUTIC EXERCISES: CPT | Performed by: PHYSICAL THERAPIST

## 2018-09-11 PROCEDURE — 97530 THERAPEUTIC ACTIVITIES: CPT | Performed by: PHYSICAL THERAPIST

## 2018-09-11 NOTE — PROGRESS NOTES
Daily Note     Today's date: 2018  Patient name: Norman Lopez  : 1950  MRN: [id number]  Referring provider: Kalli Metaclf PA-C  Dx:   Encounter Diagnosis     ICD-10-CM    1  Pyogenic arthritis of right knee joint, due to unspecified organism ([facility] [address] ) M00 9    2  Right knee pain, unspecified chronicity M25 561                   Subjective: Patient reports frustration with still having to ambulate with her cane  She was cleared to return to work 9/10 however states she has not yet returned as she is not able to tolerate standing       Objective: See treatment diary below  Patient 1:1 with KP, PT from 8:15-8:50      Precautions: Osteoporosis, s/p septic arthritis    Daily Treatment Diary     Manual      PROM of right knee to tolerance  supine and prone CK                                                                   Exercise Diary              Heel slides 10"x15 10"x15  10"x15 with therapist OP 10"x 15 w/ therapist OP 10"x15 w/ therapist OP np-nv 10"x 15 10"x 15    Quad sets 5"x10 5", 3x10 5", 3x10  5" 3x10 5" 3x10 5" 3x10 5" 3x10 5"x30    bridge        2x10 3x10    SAQ nv 2x10 2x10  5" 2x10 5" 2x10 5" 3x10 5" 3x10 5" 3x10    bike nv AAROM x5' AAROM x10' x10' x10' x10' x10' x10' x10'    Gait training  x5' x5' x15' w/ SPC x10' w/SPC x10' w/ SPC x10 w/ SPC x8' w/ SPC W/o AD nv    HR/TR  nv  nv 3x10 3x10 ea 3x10 ea 3x10 ea 3x10 ea    Prone hang   2 x 2' 2 x2' np  1# x5' 2# x5' 2#x5'    Calf stretch with strap   20"x 4 20"x4 20"x4 20"x4 20"x4 20"x4 20"x4    Seated hamstring stretch (long sit)   20"x4 20"x4 20"x4 20"x4 20"x4 20"x4 20"x4    Prone knee flexion   2x10 2x10 standing 3x10 standing 3x10 standing 3x10 standing 1# 3x10  standing 2# 2x10 standig    stairs     nv L3 x10   L3 x15 L3 x15  FWD/LAT L3 x15 FWD/LAT    Standing TKE      5"x20 5"x30 np     Forward lunging on step      L3 x15 L3 x15 L3 x15 L3 x15    SLR: flexion      x10 AAROM x5 AAROM x5 AROM 2x10 2x15 LAQ       5" 2x10 5" 3x10 2# 5"x 15    Chair squats       nv x15 x15    Standing hip abduction        x20 B x30 B        Modalities              CP   Ice at home 10' with heel prop for ext Will perform at home Will perform at home                                          Assessment: Patient demonstrates good gait quality with [facility] however does ambulate at a decreased pace  Patient most limited at this time in right quadriceps strength, progressing slowly  Patient will benefit from continued PT for gait training to wean from use of AD and for strengthening and endurance to enable her to return to work  Impairment list:  1) right LE swelling- addressing with muscle pump exercises and modalities  2) decreased right knee ROM (extension > limitation compared to flexion)- addressing with ROM and mobility exercises- tolerated PROM well, continues to have limited knee ROM with flexion contracture present  3) poor quadriceps neuromuscular control- addressing with NMRE- poor  4) decreased right LE strength- adressing with progressive strengthening  5) ambulatory dysfunction- addressing with gait training with SPC, gait training without AD nv    Goals  1  Patient will be independent in individualized HEP  - ongoing  2  Patient will ambulate without an AD - ambulating with SPC independently  3  Patient will be able to negotiate stairs with step over pattern- able to negotiate with step to pattern independently  4  Patient will be able to return to work without limitation due to WB intolerance  - not met  5  Patient will return to all activities at [facility]  Plan: Continue per plan of care  Progress as tolerated  Emphasize gait training and achieving maximal knee extension and quad strength  Gait training without SPC nv

## 2018-09-13 ENCOUNTER — OFFICE VISIT (OUTPATIENT)
Dept: PHYSICAL THERAPY | Facility: MEDICAL CENTER | Age: 68
End: 2018-09-13
Payer: MEDICARE

## 2018-09-13 DIAGNOSIS — M00.9 PYOGENIC ARTHRITIS OF RIGHT KNEE JOINT, DUE TO UNSPECIFIED ORGANISM (HCC): ICD-10-CM

## 2018-09-13 DIAGNOSIS — M25.561 RIGHT KNEE PAIN, UNSPECIFIED CHRONICITY: Primary | ICD-10-CM

## 2018-09-13 PROCEDURE — 97116 GAIT TRAINING THERAPY: CPT | Performed by: PHYSICAL THERAPIST

## 2018-09-13 PROCEDURE — G8978 MOBILITY CURRENT STATUS: HCPCS | Performed by: PHYSICAL THERAPIST

## 2018-09-13 PROCEDURE — 97530 THERAPEUTIC ACTIVITIES: CPT | Performed by: PHYSICAL THERAPIST

## 2018-09-13 PROCEDURE — G8979 MOBILITY GOAL STATUS: HCPCS | Performed by: PHYSICAL THERAPIST

## 2018-09-13 NOTE — PROGRESS NOTES
Progress Note     Today's date: 2018  Patient name: Cecilia Shankar  : 1950  MRN: [id number]  Referring provider: Alicia Parish PA-C  Dx:   Encounter Diagnosis     ICD-10-CM    1  Right knee pain, unspecified chronicity M25 561    2  Pyogenic arthritis of right knee joint, due to unspecified organism ([address] ) M00 9                   Subjective: Patient states she has become more comfortable using the cane and feels steady  She states she has been walking more frequently around her home without using the cane  She states next week she plan to return to work on a modified schedule       Objective: See treatment diary below      Precautions: Osteoporosis, s/p septic arthritis    Daily Treatment Diary     Manual     PROM of right knee to tolerance  supine and prone CK                                                                   Exercise Diary              Heel slides 10"x15 10"x15  10"x15 with therapist OP 10"x 15 w/ therapist OP 10"x15 w/ therapist OP np-nv 10"x 15 10"x 15 10"x15   Quad sets 5"x10 5", 3x10 5", 3x10  5" 3x10 5" 3x10 5" 3x10 5" 3x10 5"x30 5"x30   bridge        2x10 3x10 3x10   SAQ nv 2x10 2x10  5" 2x10 5" 2x10 5" 3x10 5" 3x10 5" 3x10 1# 5", 3x10   bike nv AAROM x5' AAROM x10' x10' x10' x10' x10' x10' x10' x10'   Gait training  x5' x5' x15' w/ SPC x10' w/SPC x10' w/ SPC x10 w/ SPC x8' w/ SPC W/o AD nv 300ft w/o AD- CS   HR/TR  nv  nv 3x10 3x10 ea 3x10 ea 3x10 ea 3x10 ea 3x10 ea   Prone hang   2 x 2' 2 x2' np  1# x5' 2# x5' 2#x5' 3# x5'   Calf stretch with strap   20"x 4 20"x4 20"x4 20"x4 20"x4 20"x4 20"x4 20"x4   Seated hamstring stretch (long sit)   20"x4 20"x4 20"x4 20"x4 20"x4 20"x4 20"x4 20"x4   Prone knee flexion   2x10 2x10 standing 3x10 standing 3x10 standing 3x10 standing 1# 3x10  standing 2# 2x10 standig 2# 3x10 standing   stairs     nv L3 x10   L3 x15 L3 x15  FWD/LAT L3 x15 FWD/LAT L3 x20 FWD/LAT   Standing TKE      5"x20 5"x30 np Forward lunging on step      L3 x15 L3 x15 L3 x15 L3 x15 L3 x15   SLR: flexion      x10 AAROM x5 AAROM x5 AROM 2x10 2x15 2x15   LAQ       5" 2x10 5" 3x10 2# 5"x 15 np   Chair squats       nv x15 x15 x15   Standing hip abduction        x20 B x30 B x30 B       Modalities              CP   Ice at home 10' with heel prop for ext Will perform at home Will perform at home                                    NPRS: 0-3/10    ROM (right knee)  Flexion (PROM): 96 degrees   Extension (AROM): 3 degrees of flexion    Strength  Right knee strength: grossly 4/5   Hip girdle strength: grossly 4/5  Fair quadriceps contraction (right)      Assessment: Ms Ina Felipe is a very pleasant 79 y o  Female who has been seen in physical therapy over the past 5 weeks for right knee pain secondary to pyogenic arthritis  Patient has been making steady progress towards her goals  She has demonstrated improvements in ROM in her right knee and overall LE strength  She does continue to have a flexion contracture of the right knee which has been limiting her quadriceps strength and ambulation  At this time patient has progressed from use of a RW to a [facility] independently and is now working on weaning from the [facility] to ambulating without an AD  Patient has also improved in her ability to negotiate stairs in which she able to negotiate with a step-to pattern independently and modified independently for a reciprocal pattern  Patient has not yet returned to work at this time however plans on returning next week at a modified schedule  Patient will benefit from continued PT at this time to address the observed impairments list below to maximize functional mobility and enable her to return to work       Impairment list:  1) right LE swelling- addressing with muscle pump exercises and modalities  2) decreased right knee ROM (extension > limitation compared to flexion)- addressing with ROM and mobility exercises-continues to have limited knee ROM with flexion contracture present  3) poor quadriceps neuromuscular control- addressing with NMRE- poor  4) decreased right LE strength- adressing with progressive strengthening  5) ambulatory dysfunction- addressing with gait training with SPC, gait training without AD nv    Goals  1  Patient will be independent in individualized HEP  - ongoing, independent with current HEP  2  Patient will ambulate without an AD - ambulating with SPC independently, ambulating without AD with CS  3  Patient will be able to negotiate stairs with step over pattern- able to negotiate with step to pattern independently, ambulates with reciprocal pattern using HR or SPC  4  Patient will be able to return to work without limitation due to WB intolerance  - not met, will return at modified schedule next week  5  Patient will return to all activities at [facility]  - ongoing     Plan: Continue per plan of care  Progress as tolerated  Emphasize gait training and achieving maximal knee extension and quad strength  Gait training without SPC nv

## 2018-09-18 ENCOUNTER — OFFICE VISIT (OUTPATIENT)
Dept: PHYSICAL THERAPY | Facility: MEDICAL CENTER | Age: 68
End: 2018-09-18
Payer: MEDICARE

## 2018-09-18 DIAGNOSIS — M00.9 PYOGENIC ARTHRITIS OF RIGHT KNEE JOINT, DUE TO UNSPECIFIED ORGANISM (HCC): ICD-10-CM

## 2018-09-18 DIAGNOSIS — M25.561 RIGHT KNEE PAIN, UNSPECIFIED CHRONICITY: Primary | ICD-10-CM

## 2018-09-18 PROCEDURE — 97116 GAIT TRAINING THERAPY: CPT | Performed by: PHYSICAL THERAPIST

## 2018-09-18 PROCEDURE — 97110 THERAPEUTIC EXERCISES: CPT | Performed by: PHYSICAL THERAPIST

## 2018-09-18 PROCEDURE — 97530 THERAPEUTIC ACTIVITIES: CPT | Performed by: PHYSICAL THERAPIST

## 2018-09-18 NOTE — PROGRESS NOTES
Daily Note     Today's date: 2018  Patient name: Elton Michael  : 1950  MRN: [id number]  Referring provider: Betra Cartagena PA-C  Dx:   Encounter Diagnosis     ICD-10-CM    1  Right knee pain, unspecified chronicity M25 561    2  Pyogenic arthritis of right knee joint, due to unspecified organism ([address] ) M00 9                   Subjective: Patient states she worked yesterday for 7 5 hours with no significant knee pain  She used her cane intermittently  She states she was very fatigued after working  Objective: See treatment diary below      Precautions: Osteoporosis, s/p septic arthritis    Daily Treatment Diary     Manual     PROM of right knee to tolerance                                                                     Exercise Diary              Heel slides 10"x 15         10"x15   Quad sets 5"x30         5"x30   bridge 3x10         3x10   SAQ 2# 5"x30         1# 5", 3x10   bike x10'         x10'   Gait training 300 ft w/o AD (S)         300ft w/o AD- CS   HR/TR 3x10 ea         3x10 ea   Prone hang 4# x5'         3# x5'   Calf stretch with strap 20"x4         20"x4   Seated hamstring stretch (long sit) 20"x4         20"x4   Standing knee flexion 3# 3x10         2# 3x10 standing   stairs L3 x 30 FWD/lAT         L3 x20 FWD/LAT   Standing TKE             Forward lunging on step L3 x15         L3 x15   SLR: flexion 2x15         2x15   LAQ np         np   Chair squats x15         x15   Standing hip abduction x30         x30 B       Modalities              CP                                              Assessment: Gait training this session focused on increased stance time on right lower extremity with increased left step length in addition to maintaining and forward gaze to prevent looking down at floor  Patient demonstrated good steadiness on feet and improved navigation around corners  Patient continues to be hesitant when passing other individuals within the hallways   Patient will benefit from continued PT for gait training and strengthening to maximize ambulation and endurance to return to work full time  Impairment list:  1) right LE swelling- addressing with muscle pump exercises and modalities  2) decreased right knee ROM (extension > limitation compared to flexion)- addressing with ROM and mobility exercises-continues to have limited knee ROM with flexion contracture present  3) poor quadriceps neuromuscular control- addressing with NMRE- fair  4) decreased right LE strength- adressing with progressive strengthening  5) ambulatory dysfunction- addressing with gait training with SPC, gait training without AD nv    Goals  1  Patient will be independent in individualized HEP  - ongoing, independent with current HEP  2  Patient will ambulate without an AD - ambulating with SPC independently, ambulating without AD with S  3  Patient will be able to negotiate stairs with step over pattern- able to negotiate with step to pattern independently, ambulates with reciprocal pattern using HR or SPC  4  Patient will be able to return to work without limitation due to WB intolerance  - not met, will return at modified schedule next week  5  Patient will return to all activities at [facility]  - ongoing     Plan: Continue per plan of care  Progress as tolerated  Emphasize gait training and achieving maximal knee extension and quad strength  Gait training without SPC

## 2018-09-20 ENCOUNTER — OFFICE VISIT (OUTPATIENT)
Dept: PHYSICAL THERAPY | Facility: MEDICAL CENTER | Age: 68
End: 2018-09-20
Payer: MEDICARE

## 2018-09-20 DIAGNOSIS — M25.561 RIGHT KNEE PAIN, UNSPECIFIED CHRONICITY: Primary | ICD-10-CM

## 2018-09-20 DIAGNOSIS — M00.9 PYOGENIC ARTHRITIS OF RIGHT KNEE JOINT, DUE TO UNSPECIFIED ORGANISM (HCC): ICD-10-CM

## 2018-09-20 PROCEDURE — 97112 NEUROMUSCULAR REEDUCATION: CPT | Performed by: PHYSICAL THERAPIST

## 2018-09-20 PROCEDURE — 97116 GAIT TRAINING THERAPY: CPT | Performed by: PHYSICAL THERAPIST

## 2018-09-20 PROCEDURE — 97530 THERAPEUTIC ACTIVITIES: CPT | Performed by: PHYSICAL THERAPIST

## 2018-09-20 PROCEDURE — 97110 THERAPEUTIC EXERCISES: CPT | Performed by: PHYSICAL THERAPIST

## 2018-09-20 NOTE — PROGRESS NOTES
Daily Note     Today's date: 2018  Patient name: Jann Yeboah  : 1950  MRN: [id number]  Referring provider: José Atkins PA-C  Dx:   Encounter Diagnosis     ICD-10-CM    1  Right knee pain, unspecified chronicity M25 561    2  Pyogenic arthritis of right knee joint, due to unspecified organism ([address] ) M00 9                   Subjective: Patient states she worked yesterday for 8 5 hours and states she was very tired after and had increased knee pain  She states she is feeling better this morning  She states she has been able to assist in cooking and is negotiates stairs with a reciprocal pattern the majority of the time unless she is tired  She states at this time she is most limited in walking while carrying groceries or a heavy plate of foot or pot       Objective: See treatment diary below      Precautions: Osteoporosis, s/p septic arthritis    Daily Treatment Diary     Manual     PROM of right knee to tolerance                                                                     Exercise Diary              Heel slides 10"x 15 np        10"x15   Quad sets 5"x30 5"x30 prone        5"x30   bridge 3x10 3x10        3x10   SAQ 2# 5"x30 2# 5"x30        1# 5", 3x10   bike x10' x10'        x10'   Gait training 300 ft w/o AD (S) See assessment        300ft w/o AD- CS   HR/TR 3x10 ea 3x10 ea        3x10 ea   Prone hang 4# x5' 4# x5'        3# x5'   Calf stretch with strap 20"x4 20"x4        20"x4   Seated hamstring stretch (long sit) 20"x4 20"x4        20"x4   Standing knee flexion 3# 3x10 3# 3x10        2# 3x10 standing   stairs L3 x 30 FWD/lAT L3 x30 FWD/LAT        L3 x20 FWD/LAT   Forward lunging on step L3 x15 L3 x15        L3 x15   SLR: flexion 2x15 2x15        2x15   LAQ np np        np   Chair squats x15 x15        x15   Standing hip abduction x30 2# x30 B        x30 B   Prone quad stretch  20"x4               Modalities              CP Assessment: Gait training this session focused initially on increasing heel strike at initial contact on the right and increasing left stride length for increased stance time on right  To address patient's concerns with carrying heavy plates and pots as well as groceries we worked on gait training with varying weights (5-10#)  Initiated with carrying single dumbbell to mimic grocery bag and progressed to 2 dumbbells, one in each hand  Then progressed to weight plate to bring arms closer into body  Patient reported feeling much more comfortable in carrying out these tasks at the end  Patient instructed in prone quad stretch to further facilitate knee flexion  Quad sets were also performed in prone this session in which patient was challenged by  Patient instructed to continue with prone quad sets as part of HEP  Patient provided with updated HEP  Impairment list:  1) right LE swelling- addressing with muscle pump exercises and modalities  2) decreased right knee ROM (extension > limitation compared to flexion)- addressing with ROM and mobility exercises-continues to have limited knee ROM with flexion contracture present  3) poor quadriceps neuromuscular control- addressing with NMRE- fair  4) decreased right LE strength- adressing with progressive strengthening  5) ambulatory dysfunction- addressing with gait training without AD    Goals  1  Patient will be independent in individualized HEP  - ongoing, independent with current HEP  2  Patient will ambulate without an AD - ambulating with SPC independently, ambulating without AD with S  3  Patient will be able to negotiate stairs with step over pattern- able to negotiate with step to pattern independently, ambulates with reciprocal pattern using HR or SPC  4  Patient will be able to return to work without limitation due to WB intolerance  - not met, will return at modified schedule next week  5  Patient will return to all activities at [facility]   - ongoing Plan: Continue per plan of care  Progress as tolerated  Emphasize gait training and achieving maximal knee extension and quad strength  Gait training without SPC

## 2018-09-25 ENCOUNTER — OFFICE VISIT (OUTPATIENT)
Dept: PHYSICAL THERAPY | Facility: MEDICAL CENTER | Age: 68
End: 2018-09-25
Payer: MEDICARE

## 2018-09-25 DIAGNOSIS — M25.561 RIGHT KNEE PAIN, UNSPECIFIED CHRONICITY: Primary | ICD-10-CM

## 2018-09-25 DIAGNOSIS — M00.9 PYOGENIC ARTHRITIS OF RIGHT KNEE JOINT, DUE TO UNSPECIFIED ORGANISM (HCC): ICD-10-CM

## 2018-09-25 PROCEDURE — 97530 THERAPEUTIC ACTIVITIES: CPT | Performed by: PHYSICAL THERAPIST

## 2018-09-25 PROCEDURE — 97110 THERAPEUTIC EXERCISES: CPT | Performed by: PHYSICAL THERAPIST

## 2018-09-25 NOTE — PROGRESS NOTES
Daily Note     Today's date: 2018  Patient name: Army Busch  : 1950  MRN: [id number]  Referring provider: Cindy Centeno PA-C  Dx:   Encounter Diagnosis     ICD-10-CM    1  Right knee pain, unspecified chronicity M25 561    2  Pyogenic arthritis of right knee joint, due to unspecified organism ([facility] [address] ) M00 9                   Subjective: Patient reports maintaining a 7 hour work day for 4 days of the week with good tolerance  She states she continues to improve in her standing and walking tolerance without use of her [facility] and reports no issues with steps at this time  She states after working on walking and carrying the weights last treatment session she has been more confident in her ability to carry plates to/from the dinner table       Objective: See treatment diary below      Precautions: Osteoporosis, s/p septic arthritis    Daily Treatment Diary     Manual            PROM of right knee to tolerance                                                                     Exercise Diary              Heel slides 10"x 15 np           Quad sets 5"x30 5"x30 prone 5"x30 prone          bridge 3x10 3x10 3x10          SAQ 2# 5"x30 2# 5"x30 3# 5"x20          bike x10' x10' x10'          Gait training 300 ft w/o AD (S) See assessment See assessment          HR/TR 3x10 ea 3x10 ea 3x10 ea          Prone hang 4# x5' 4# x5' 4# x6'          Calf stretch with strap 20"x4 20"x4 20"x4          Seated hamstring stretch (long sit) 20"x4 20"x4 20"x4          Standing knee flexion 3# 3x10 3# 3x10           stairs L3 x 30 FWD/lAT L3 x30 FWD/LAT L3 x30 FWD/LAT          Forward lunging on step L3 x15 L3 x15 L3 x15          SLR: flexion 2x15 2x15 1# 2x15          LAQ np np           Chair squats x15 x15           Standing hip abduction x30 2# x30 B 3# x30 B          Prone quad stretch  20"x4 20"x4              Modalities              CP                                          ROM right knee 3-100 degrees (9/25    Assessment: Continued gait training this session with dumbbells and weight plates to mimic carrying food trays and grocery bags, weights varied between 10-25#  Patient demonstrated improved ability to self correct for heel strike at initial contact and increasing step length on left to improve right stance time  Patient continues to demonstrate gradual progress in her right knee ROM  Her improved tolerance to stretching has most likely contributed to this  Impairment list:  1) right LE swelling- addressing with muscle pump exercises and modalities (patient ices consistently and performs muscle pump exercises at home)  2) decreased right knee ROM (extension > limitation compared to flexion)- addressing with ROM and mobility exercises-continues to have limited knee ROM with flexion contracture present  3) poor quadriceps neuromuscular control- addressing with NMRE- fair  4) decreased right LE strength- adressing with progressive strengthening  5) ambulatory dysfunction- addressing with gait training without AD and added UE tasks    Goals  1  Patient will be independent in individualized HEP  - ongoing, independent with current HEP  2  Patient will ambulate without an AD - ambulating independently without AD  3  Patient will be able to negotiate stairs with step over pattern- able to negotiate with step to pattern independently, ambulates with reciprocal pattern using HR or SPC  4  Patient will be able to return to work without limitation due to WB intolerance  - returned at modified schedule (7 hours/day 4 days/week)  5  Patient will return to all activities at [facility]  - ongoing     Plan: Potential DC with HEP nv

## 2018-09-28 ENCOUNTER — OFFICE VISIT (OUTPATIENT)
Dept: PHYSICAL THERAPY | Facility: MEDICAL CENTER | Age: 68
End: 2018-09-28
Payer: MEDICARE

## 2018-09-28 DIAGNOSIS — M00.9 PYOGENIC ARTHRITIS OF RIGHT KNEE JOINT, DUE TO UNSPECIFIED ORGANISM (HCC): ICD-10-CM

## 2018-09-28 DIAGNOSIS — M25.561 RIGHT KNEE PAIN, UNSPECIFIED CHRONICITY: Primary | ICD-10-CM

## 2018-09-28 PROCEDURE — 97110 THERAPEUTIC EXERCISES: CPT | Performed by: PHYSICAL THERAPIST

## 2018-09-28 PROCEDURE — 97116 GAIT TRAINING THERAPY: CPT | Performed by: PHYSICAL THERAPIST

## 2018-09-28 PROCEDURE — G8979 MOBILITY GOAL STATUS: HCPCS | Performed by: PHYSICAL THERAPIST

## 2018-09-28 PROCEDURE — G8980 MOBILITY D/C STATUS: HCPCS | Performed by: PHYSICAL THERAPIST

## 2018-09-28 NOTE — PROGRESS NOTES
Discharge Summary    Today's date: 2018  Patient name: Vanna Alonzo  : 1950  MRN: [id number]  Referring provider: Myra Stallings PA-C  Dx:   Encounter Diagnosis     ICD-10-CM    1  Right knee pain, unspecified chronicity M25 561    2  Pyogenic arthritis of right knee joint, due to unspecified organism ([address] ) M00 9                   Subjective: Patient reports maintaining a 7 hour work day for 4 days of the week with good tolerance  She states she continues to improve in her standing and walking tolerance without use of her [facility] and reports no issues with steps at this time  She states after working on walking and carrying the weights last treatment session she has been more confident in her ability to carry plates to/from the dinner table       Objective: See treatment diary below      Precautions: Osteoporosis, s/p septic arthritis    Daily Treatment Diary     Manual           PROM of right knee to tolerance                                                                     Exercise Diary              Heel slides 10"x 15 np           Quad sets 5"x30 5"x30 prone 5"x30 prone 5"x30 prone         bridge 3x10 3x10 3x10 3x10         SAQ 2# 5"x30 2# 5"x30 3# 5"x20 3# 5"x20         bike x10' x10' x10' x10'         Gait training 300 ft w/o AD (S) See assessment See assessment          HR/TR 3x10 ea 3x10 ea 3x10 ea np         Prone hang 4# x5' 4# x5' 4# x6' 4# x6'         Calf stretch with strap 20"x4 20"x4 20"x4 20"x4         Seated hamstring stretch (long sit) 20"x4 20"x4 20"x4 20"x4         Standing knee flexion 3# 3x10 3# 3x10  np         stairs L3 x 30 FWD/lAT L3 x30 FWD/LAT L3 x30 FWD/LAT L3 x30 FWD/LAT         Forward lunging on step L3 x15 L3 x15 L3 x15 L3 x15         SLR: flexion 2x15 2x15 1# 2x15 1# 2x15         LAQ np np           Chair squats x15 x15           Standing hip abduction x30 2# x30 B 3# x30 B np         Prone quad stretch  20"x4 20"x4 20"x4             Modalities CP                                          NPRS: 0/10  ROM right knee 3-105 degrees  Gross 4+/5 knee strength  Gross 4/5 hip girdle strength    Assessment: Ms Ina Felipe has made steady progress since starting physical therapy and has met all of her goals at this time  She has transitioned from a RW to ambulating independently without an AD but does have her SPC cane available that she uses occasionally for community ambulation when she is fatigued  She negotiates steps with a reciprocal pattern independently  She is able to complete all ADLs/IADLs at home independently including cooking, cleaning, and carrying groceries/dishes  She has returned to work at this time 7 hours/day for 4 days/week where she takes rest breaks as needed  She is alPatient has been compliant with her HEP and demonstrates excellent knowledge of HEP  She has made significant proress in her right knee ROM despite the ongoing limitations that continue to exist; her available range is functional at this time  As patient has met all of her goals at this time she will be discharged from PT and continue with her comprehensive HEP  Patient agrees with this plan  Goals  1  Patient will be independent in individualized HEP  - -met  2  Patient will ambulate without an AD  - met, uses SPC intermittently within the community   3  Patient will be able to negotiate stairs with step over pattern- -met-ambulates with reciprocal pattern using HR or SPC  4  Patient will be able to return to work without limitation due to WB intolerance  - met  5  Patient will return to all activities at [facility]   - met     Plan: DC with HEP

## 2020-06-02 ENCOUNTER — APPOINTMENT (OUTPATIENT)
Dept: URGENT CARE | Facility: MEDICAL CENTER | Age: 70
End: 2020-06-02
Payer: OTHER MISCELLANEOUS

## 2020-06-02 PROCEDURE — 99213 OFFICE O/P EST LOW 20 MIN: CPT | Performed by: PHYSICIAN ASSISTANT

## 2022-06-10 ENCOUNTER — APPOINTMENT (OUTPATIENT)
Dept: URGENT CARE | Facility: MEDICAL CENTER | Age: 72
End: 2022-06-10

## 2022-06-13 ENCOUNTER — APPOINTMENT (OUTPATIENT)
Dept: URGENT CARE | Facility: MEDICAL CENTER | Age: 72
End: 2022-06-13
Payer: OTHER MISCELLANEOUS

## 2022-06-13 PROCEDURE — 99213 OFFICE O/P EST LOW 20 MIN: CPT | Performed by: PHYSICIAN ASSISTANT
